# Patient Record
Sex: MALE | Race: WHITE | NOT HISPANIC OR LATINO | URBAN - METROPOLITAN AREA
[De-identification: names, ages, dates, MRNs, and addresses within clinical notes are randomized per-mention and may not be internally consistent; named-entity substitution may affect disease eponyms.]

---

## 2018-03-29 ENCOUNTER — EMERGENCY (EMERGENCY)
Facility: HOSPITAL | Age: 28
LOS: 0 days | Discharge: HOME | End: 2018-03-29

## 2018-03-29 VITALS
TEMPERATURE: 97 F | HEART RATE: 108 BPM | SYSTOLIC BLOOD PRESSURE: 132 MMHG | RESPIRATION RATE: 18 BRPM | OXYGEN SATURATION: 98 % | DIASTOLIC BLOOD PRESSURE: 74 MMHG

## 2018-03-29 DIAGNOSIS — F17.200 NICOTINE DEPENDENCE, UNSPECIFIED, UNCOMPLICATED: ICD-10-CM
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
## 2018-03-29 DIAGNOSIS — S79.112A: ICD-10-CM
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
## 2018-03-29 DIAGNOSIS — M79.662 PAIN IN LEFT LOWER LEG: ICD-10-CM
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
## 2018-03-29 RX ORDER — IBUPROFEN 200 MG
800 TABLET ORAL ONCE
Qty: 0 | Refills: 0 | Status: COMPLETED | OUTPATIENT
Start: 2018-03-29 | End: 2018-03-29

## 2018-03-29 RX ADMIN — Medication 800 MILLIGRAM(S): at 14:35

## 2018-03-29 NOTE — ED PROVIDER NOTE - MEDICAL DECISION MAKING DETAILS
I have discussed the discharge plan with the patient. The patient agrees with the plan, as discussed.  The patient understands Emergency Department diagnosis is a preliminary diagnosis often based on limited information and that the patient must adhere to the follow-up plan as discussed.  The patient understands that if the symptoms worsen or if prescribed medications do not have the desired/planned effect that the patient may return to the Emergency Department at any time for further evaluation and treatment.  Told about quadriceps- concern of possible tear

## 2018-03-29 NOTE — ED PROVIDER NOTE - PHYSICAL EXAMINATION
left knee= tenderness to the lateral knee and supra patella effusion present and tenderness on palpation. Able to fully extend leg - however am concearned of possible quad tendon tear

## 2018-03-29 NOTE — ED PROVIDER NOTE - OBJECTIVE STATEMENT
this is a 28 year old male feel from directly on to this left leg , now complaining of left lower leg pain and knee pain - Patient was siin at [facility] md and diagnosed with proximal fib fracture- has officially read images with him - no femur fracture - patient complaining of left upper knee pain also

## 2018-03-29 NOTE — ED ADULT TRIAGE NOTE - CHIEF COMPLAINT QUOTE
s/p fall yesterday has left leg pain, [facility] and was told he has a fracture of left fibula, referred here for ortho

## 2022-05-31 NOTE — ED ADULT NURSE NOTE - CADM POA URETHRAL CATHETER
Called patient to provide phone number for [facility]. Reminded patient to call neurosurgery clinic once MRI is completed.     Faxed MRI order May 31, 2022 to fax number [phone] ([facility] Radiology Scheduling)    Right Fax confirmed at 2:43 PM     No

## 2023-10-27 NOTE — ED PROVIDER NOTE - CPE EDP GASTRO NORM
The skin of the right wrist was clipped, prepped and draped in the usual sterile manner. (If not otherwise specified, skin prep was bilateral.) normal...